# Patient Record
Sex: MALE | Race: BLACK OR AFRICAN AMERICAN | Employment: UNEMPLOYED | URBAN - METROPOLITAN AREA
[De-identification: names, ages, dates, MRNs, and addresses within clinical notes are randomized per-mention and may not be internally consistent; named-entity substitution may affect disease eponyms.]

---

## 2021-10-19 ENCOUNTER — APPOINTMENT (OUTPATIENT)
Dept: GENERAL RADIOLOGY | Age: 61
End: 2021-10-19
Payer: MEDICAID

## 2021-10-19 ENCOUNTER — APPOINTMENT (OUTPATIENT)
Dept: CT IMAGING | Age: 61
End: 2021-10-19
Payer: MEDICAID

## 2021-10-19 ENCOUNTER — HOSPITAL ENCOUNTER (EMERGENCY)
Age: 61
Discharge: HOME | End: 2021-10-19
Attending: EMERGENCY MEDICINE
Payer: MEDICAID

## 2021-10-19 VITALS
BODY MASS INDEX: 25.34 KG/M2 | WEIGHT: 181 LBS | OXYGEN SATURATION: 96 % | HEART RATE: 67 BPM | HEIGHT: 71 IN | TEMPERATURE: 98.4 F | DIASTOLIC BLOOD PRESSURE: 93 MMHG | RESPIRATION RATE: 19 BRPM | SYSTOLIC BLOOD PRESSURE: 124 MMHG

## 2021-10-19 DIAGNOSIS — R44.0 AUDITORY HALLUCINATIONS: Primary | ICD-10-CM

## 2021-10-19 LAB
A/G RATIO: 1.2 (ref 1.1–2.2)
ACETAMINOPHEN LEVEL: <5 UG/ML (ref 10–30)
ALBUMIN SERPL-MCNC: 4.2 G/DL (ref 3.4–5)
ALP BLD-CCNC: 60 U/L (ref 40–129)
ALT SERPL-CCNC: 23 U/L (ref 10–40)
ANION GAP SERPL CALCULATED.3IONS-SCNC: 8 MMOL/L (ref 3–16)
AST SERPL-CCNC: 13 U/L (ref 15–37)
BASOPHILS ABSOLUTE: 0 K/UL (ref 0–0.2)
BASOPHILS RELATIVE PERCENT: 0.4 %
BILIRUB SERPL-MCNC: 0.8 MG/DL (ref 0–1)
BUN BLDV-MCNC: 18 MG/DL (ref 7–20)
CALCIUM SERPL-MCNC: 10 MG/DL (ref 8.3–10.6)
CHLORIDE BLD-SCNC: 100 MMOL/L (ref 99–110)
CO2: 25 MMOL/L (ref 21–32)
CREAT SERPL-MCNC: 0.7 MG/DL (ref 0.8–1.3)
EKG ATRIAL RATE: 67 BPM
EKG DIAGNOSIS: NORMAL
EKG P AXIS: 48 DEGREES
EKG P-R INTERVAL: 148 MS
EKG Q-T INTERVAL: 374 MS
EKG QRS DURATION: 108 MS
EKG QTC CALCULATION (BAZETT): 395 MS
EKG R AXIS: -31 DEGREES
EKG T AXIS: 10 DEGREES
EKG VENTRICULAR RATE: 67 BPM
EOSINOPHILS ABSOLUTE: 0.2 K/UL (ref 0–0.6)
EOSINOPHILS RELATIVE PERCENT: 3.4 %
ETHANOL: NORMAL MG/DL (ref 0–0.08)
GFR AFRICAN AMERICAN: >60
GFR NON-AFRICAN AMERICAN: >60
GLOBULIN: 3.6 G/DL
GLUCOSE BLD-MCNC: 85 MG/DL (ref 70–99)
HCT VFR BLD CALC: 46.7 % (ref 40.5–52.5)
HEMOGLOBIN: 16.1 G/DL (ref 13.5–17.5)
LYMPHOCYTES ABSOLUTE: 1.7 K/UL (ref 1–5.1)
LYMPHOCYTES RELATIVE PERCENT: 30.3 %
MCH RBC QN AUTO: 30.2 PG (ref 26–34)
MCHC RBC AUTO-ENTMCNC: 34.5 G/DL (ref 31–36)
MCV RBC AUTO: 87.6 FL (ref 80–100)
MONOCYTES ABSOLUTE: 0.9 K/UL (ref 0–1.3)
MONOCYTES RELATIVE PERCENT: 16.2 %
NEUTROPHILS ABSOLUTE: 2.7 K/UL (ref 1.7–7.7)
NEUTROPHILS RELATIVE PERCENT: 49.7 %
PDW BLD-RTO: 14.7 % (ref 12.4–15.4)
PLATELET # BLD: 221 K/UL (ref 135–450)
PMV BLD AUTO: 7.2 FL (ref 5–10.5)
POTASSIUM REFLEX MAGNESIUM: 4.4 MMOL/L (ref 3.5–5.1)
RBC # BLD: 5.33 M/UL (ref 4.2–5.9)
SALICYLATE, SERUM: <0.3 MG/DL (ref 15–30)
SODIUM BLD-SCNC: 133 MMOL/L (ref 136–145)
TOTAL PROTEIN: 7.8 G/DL (ref 6.4–8.2)
WBC # BLD: 5.5 K/UL (ref 4–11)

## 2021-10-19 PROCEDURE — 93005 ELECTROCARDIOGRAM TRACING: CPT | Performed by: PHYSICIAN ASSISTANT

## 2021-10-19 PROCEDURE — 70450 CT HEAD/BRAIN W/O DYE: CPT

## 2021-10-19 PROCEDURE — 85025 COMPLETE CBC W/AUTO DIFF WBC: CPT

## 2021-10-19 PROCEDURE — 80179 DRUG ASSAY SALICYLATE: CPT

## 2021-10-19 PROCEDURE — 82077 ASSAY SPEC XCP UR&BREATH IA: CPT

## 2021-10-19 PROCEDURE — 93010 ELECTROCARDIOGRAM REPORT: CPT | Performed by: INTERNAL MEDICINE

## 2021-10-19 PROCEDURE — 99283 EMERGENCY DEPT VISIT LOW MDM: CPT

## 2021-10-19 PROCEDURE — 80143 DRUG ASSAY ACETAMINOPHEN: CPT

## 2021-10-19 PROCEDURE — 80053 COMPREHEN METABOLIC PANEL: CPT

## 2021-10-19 PROCEDURE — 71045 X-RAY EXAM CHEST 1 VIEW: CPT

## 2021-10-19 PROCEDURE — 6370000000 HC RX 637 (ALT 250 FOR IP): Performed by: EMERGENCY MEDICINE

## 2021-10-19 RX ORDER — HALOPERIDOL 1 MG/1
0.5 TABLET ORAL ONCE
Status: COMPLETED | OUTPATIENT
Start: 2021-10-19 | End: 2021-10-19

## 2021-10-19 RX ADMIN — HALOPERIDOL 0.5 MG: 1 TABLET ORAL at 14:44

## 2021-10-19 ASSESSMENT — ENCOUNTER SYMPTOMS
VOMITING: 0
RESPIRATORY NEGATIVE: 1
COLOR CHANGE: 0
CONSTIPATION: 0
COUGH: 0
ABDOMINAL PAIN: 0
BACK PAIN: 0
CHEST TIGHTNESS: 0
NAUSEA: 0
SHORTNESS OF BREATH: 0
DIARRHEA: 0
PHOTOPHOBIA: 0

## 2021-10-19 NOTE — ED NOTES
Patient in by EMS  from [facility] care where he is a long term resident there for a stroke that he had in 2018, EMS reporting that patient has been having auditory hallucinations and reporting that the voices have been telling him to kill himself, patient denies plans on listening to the voices and killing self. Patient has had this happen before about 5 years ago and reporting he was on 3 medications such as haldol, cogentin, and Depakote, however no longer takes this medications, patient doesn't seemed too concerned with what is going on today because he is adamant on going back to facility by 7pm tonight to make sure he gets his cell phone and a meal tray and watches his evening TV programs. IV placed, blood sent to lab, will continue to monitor.       Arabella Amos RN  10/19/21 2681

## 2021-10-19 NOTE — ED NOTES
Report attempted to call back to [facility] health care x2 , however never go a hold of anyone, report given to first care on arrival. Pt left ER in stable in condition.       Aminata Arriola, RN  10/19/21 8553

## 2021-10-19 NOTE — ED PROVIDER NOTES
hallucinations, these are not overt and he answers my questions very reasonably and has good insight into his problem. Will discharge with all questions answered and return precautions given. Head CT showed no evidence of intracranial bleed. No metabolic abnormalities found. Not in any pain. Patient Referrals:  No follow-up provider specified. Discharge Medications:  New Prescriptions    No medications on file       FINAL IMPRESSION  1. Auditory hallucinations        Blood pressure (!) 124/93, pulse 67, temperature 98.4 °F (36.9 °C), resp. rate 19, height 5' 11\" (1.803 m), weight 181 lb (82.1 kg), SpO2 96 %. For further details of [facility] emergency department encounter, please see documentation by advanced practice providerKatherine.        Leda Mendoza MD  10/19/21 1088

## 2021-10-19 NOTE — ED PROVIDER NOTES
[address] [facility]        Pt Name: Pop Felix  MRN: [id number]  [place] 1960  Date of evaluation: 10/19/2021  Provider: SKYLAR Gonzales  PCP: No primary care provider on file. Note Started: 1:18 PM EDT        I have seen and evaluated this patient with my supervising physician Lamar Haynes MD.    [address] [place] [address]       Chief Complaint   Patient presents with    Hallucinations     in by EMS from [facility], patient sent by MD over there for auditory hallucinations telling him to kill himself, pt reports hx of this about 5 years ago where he was on haldol, cogentin, and depakote, however doesnt take those aymore, and hx of stroke in 2018       HISTORY OF PRESENT ILLNESS   (Location, Timing/Onset, Context/Setting, Quality, Duration, Modifying Factors, Severity, Associated Signs and Symptoms)  Note limiting factors. Chief Complaint: Auditory hallucinations. Pop Felix is a 62 y.o. male with no significant past medical history who presents to the ED by EMS from [facility] with complaint of auditory hallucinations. Patient sent to the ED by facility MD with concern for auditory hallucinations and suicidal ideation. Patient states since Friday he has been having auditory hallucinations where he has been hearing voices that are telling him to kill himself. Patient states he was \"never do it\". Patient denies any specific plan. Denies any specific attempt. Apparently had similar episode about 5 years ago and he states at that time he was on numerous medications. Patient states he was on Depakote, Haldol and Cogentin at that time but states he is no longer on these medications. Medication list not sent from facility patient states he is on some medications at home but not sure of the names. Denies any drug or alcohol usage. Denies any headache or fall.   Denies any neck pain, fever/chills, rashes/lesions, chest pain, shortness of breath, cough, abdominal pain, nausea/vomiting, urinary symptoms or changes in bowel movements. Nursing Notes were all reviewed and agreed with or any disagreements were addressed in the HPI. REVIEW OF SYSTEMS    (2-9 systems for level 4, 10 or more for level 5)     Review of Systems   Constitutional: Negative for activity change, appetite change, chills and fever. Eyes: Negative for photophobia and visual disturbance. Respiratory: Negative. Negative for cough, chest tightness and shortness of breath. Cardiovascular: Negative. Negative for chest pain, palpitations and leg swelling. Gastrointestinal: Negative for abdominal pain, constipation, diarrhea, nausea and vomiting. Genitourinary: Negative for decreased urine volume, difficulty urinating, dysuria, flank pain, frequency, hematuria and urgency. Musculoskeletal: Negative for arthralgias, back pain, myalgias, neck pain and neck stiffness. Skin: Negative for color change, pallor, rash and wound. Neurological: Negative for dizziness, light-headedness and headaches. Psychiatric/Behavioral: Positive for hallucinations and suicidal ideas. Negative for agitation, behavioral problems, confusion, decreased concentration, self-injury and sleep disturbance. The patient is not nervous/anxious and is not hyperactive. Positives and Pertinent negatives as per HPI. Except as noted above in the ROS, all other systems were reviewed and negative. PAST MEDICAL HISTORY   History reviewed. No pertinent past medical history. SURGICAL HISTORY   History reviewed. No pertinent surgical history. CURRENTMEDICATIONS       Previous Medications    No medications on file         ALLERGIES     Patient has no known allergies. FAMILYHISTORY     History reviewed. No pertinent family history.        SOCIAL HISTORY       Social History     Tobacco Use    Smoking status: Never Smoker    Smokeless tobacco: Never Used   Substance Use Topics    Alcohol use: Never    Drug use: Not Currently       SCREENINGS             PHYSICAL EXAM    (up to 7 for level 4, 8 or more for level 5)     ED Triage Vitals [10/19/21 1221]   BP Temp Temp src Pulse Resp SpO2 Height Weight   (!) 142/102 98.4 °F (36.9 °C) -- 67 17 98 % 5' 11\" (1.803 m) 181 lb (82.1 kg)       Physical Exam  Constitutional:       General: He is not in acute distress. Appearance: Normal appearance. He is well-developed. He is not ill-appearing, toxic-appearing or diaphoretic. HENT:      Head: Normocephalic and atraumatic. Right Ear: External ear normal.      Left Ear: External ear normal.      Mouth/Throat:      Mouth: Mucous membranes are moist.      Pharynx: No oropharyngeal exudate or posterior oropharyngeal erythema. Eyes:      General:         Right eye: No discharge. Left eye: No discharge. Extraocular Movements: Extraocular movements intact. Conjunctiva/sclera: Conjunctivae normal.      Pupils: Pupils are equal, round, and reactive to light. Cardiovascular:      Rate and Rhythm: Normal rate and regular rhythm. Pulses: Normal pulses. Heart sounds: Normal heart sounds. No murmur heard. No friction rub. No gallop. Comments: 2+ radial pulses bilaterally. No pedal edema. No calf tenderness. No JVD. Pulmonary:      Effort: Pulmonary effort is normal. No respiratory distress. Breath sounds: Normal breath sounds. No stridor. No wheezing, rhonchi or rales. Chest:      Chest wall: No tenderness. Abdominal:      General: Abdomen is flat. Bowel sounds are normal. There is no distension. Palpations: Abdomen is soft. There is no mass. Tenderness: There is no abdominal tenderness. There is no right CVA tenderness, left CVA tenderness, guarding or rebound. Negative signs include Szymanski's sign and McBurney's sign. Hernia: No hernia is present. Musculoskeletal:         General: Normal range of motion.       Cervical back: Normal range of motion and neck supple. No rigidity or tenderness. Lymphadenopathy:      Cervical: No cervical adenopathy. Skin:     General: Skin is warm and dry. Coloration: Skin is not pale. Findings: No erythema. Neurological:      General: No focal deficit present. Mental Status: He is alert. GCS: GCS eye subscore is 4. GCS verbal subscore is 5. GCS motor subscore is 6. Cranial Nerves: Cranial nerves are intact. No cranial nerve deficit, dysarthria or facial asymmetry. Sensory: Sensation is intact. No sensory deficit. Motor: Motor function is intact. Comments: Gait deferred. Psychiatric:         Attention and Perception: He perceives auditory hallucinations. Mood and Affect: Mood and affect normal.         Speech: Speech normal.         Behavior: Behavior normal. Behavior is cooperative. Thought Content: Thought content does not include homicidal or suicidal ideation. Thought content does not include homicidal or suicidal plan.          DIAGNOSTIC RESULTS   LABS:    Labs Reviewed   SALICYLATE LEVEL - Abnormal; Notable for the following components:       Result Value    Salicylate, Serum <8.1 (*)     All other components within normal limits    Narrative:     Performed at:  [facility] [address], [place], [facility] [place]   Phone [phone]   ACETAMINOPHEN LEVEL - Abnormal; Notable for the following components:    Acetaminophen Level <5 (*)     All other components within normal limits    Narrative:     Performed at:  [facility] [address], [place], [address] [place]   Phone [phone]   COMPREHENSIVE METABOLIC PANEL W/ REFLEX TO MG FOR LOW K - Abnormal; Notable for the following components:    Sodium 133 (*)     CREATININE 0.7 (*)     AST 13 (*)     All other components within normal limits    Narrative:     Performed at:  [facility]  [address] [facility],  [facility], [address] [facility]   Phone [phone]   ETHANOL    Narrative:     Performed at:  [facility]  [address] [facility], [address] [facility]   Phone [phone]   CBC WITH AUTO DIFFERENTIAL    Narrative:     Performed at:  [facility]  [address] [facility], [address] [facility]   Phone [phone]   URINE RT REFLEX TO CULTURE   URINE DRUG SCREEN       When ordered only abnormal lab results are displayed. All other labs were within normal range or not returned as of this dictation. EKG: When ordered, EKG's are interpreted by the Emergency Department Physician in the absence of a cardiologist.  Please see their note for interpretation of EKG. RADIOLOGY:   Non-plain film images such as CT, Ultrasound and MRI are read by the radiologist. Plain radiographic images are visualized and preliminarily interpreted by the ED Provider with the below findings:        Interpretation per the Radiologist below, if available at the time of this note:    CT HEAD WO CONTRAST   Final Result   No acute intracranial abnormality. There microvascular changes with remote right MCA distribution stroke. XR CHEST PORTABLE   Final Result   No acute cardiac or pulmonary disease. XR CHEST PORTABLE    Result Date: 10/19/2021  EXAMINATION: ONE XRAY VIEW OF THE CHEST 10/19/2021 12:44 pm COMPARISON: None. HISTORY: ORDERING SYSTEM PROVIDED HISTORY: ams TECHNOLOGIST PROVIDED HISTORY: Reason for exam:->ams Reason for Exam: Hallucinations (in by EMS from [facility], patient sent by MD over there for auditory hallucinations telling him to kill himself, pt reports hx of this about 5 years ago where he was on haldol, cogentin, and depakote, however doesnt take those aymore, and hx of stroke in 2018) Acuity: Acute Type of Exam: Initial FINDINGS: The heart is within normal limits in size.   A monitoring device overlies the left mid chest.  Pulmonary vessels are normal.  Lungs are clear. There is deformity from old upper posterior left rib fractures. No acute airspace disease, pleural effusion or pneumothorax. No acute cardiac or pulmonary disease. PROCEDURES   Unless otherwise noted below, none     Procedures    CRITICAL CARE TIME   N/A    CONSULTS:  None      EMERGENCY DEPARTMENT COURSE and DIFFERENTIAL DIAGNOSIS/MDM:   Vitals:    Vitals:    10/19/21 1221 10/19/21 1245   BP: (!) 142/102 (!) 124/93   Pulse: 67 67   Resp: 17 19   Temp: 98.4 °F (36.9 °C)    SpO2: 98% 96%   Weight: 181 lb (82.1 kg)    Height: 5' 11\" (1.803 m)        Patient was given the following medications:  Medications   haloperidol (HALDOL) tablet 0.5 mg (0.5 mg Oral Given 10/19/21 1444)           Patient is a 43-year-old male who presents to the ED with complaint of hallucinations. Patient complaint of left lower hallucinations that are telling him to kill himself. Patient dates he would never act on these. Patient denies any plan or actively being suicidal at this time. Apparently has had similar episodes in the past where he was on Haldol, Cogentin and Depakote. No longer on these medications. Was given dose of Haldol here in the emergency department. Neurologically intact. Patient had blood work ordered. Salicylate, acetaminophen and ethanol negative. CBC showed normal white count, hemoglobin and platelets. CMP relatively unremarkable. CT of the head unremarkable. Chest x-ray unremarkable. EKG interpreted by attending. Patient denies any suicidal ideation at this time. Patient states he would not harm himself. Is at a extended care facility and believe patient can be safely discharged back to extended care facility with close return precautions. Should symptoms worsen or he is concerned that he would potentially act on these auditory hallucinations he needs to return to the emergency department.   Believe transfer to psychiatric facility would not be beneficial for patient at this time. Believe can be safely discharged home with close outpatient follow-up. Return to the ED for any worsening symptoms. FINAL IMPRESSION      1. Auditory hallucinations          DISPOSITION/PLAN   DISPOSITION Decision To Discharge 10/19/2021 02:14:27 PM      PATIENT REFERRED TO:  No follow-up provider specified.     DISCHARGE MEDICATIONS:  New Prescriptions    No medications on file       DISCONTINUED MEDICATIONS:  Discontinued Medications    No medications on file              (Please note that portions of this note were completed with a voice recognition program.  Efforts were made to edit the dictations but occasionally words are mis-transcribed.)    SKYLAR Moran (electronically signed)          SKYLAR Lisa  10/19/21 3996